# Patient Record
(demographics unavailable — no encounter records)

---

## 2025-02-05 NOTE — PHYSICAL EXAM
[General Appearance - Alert] : alert [General Appearance - In No Acute Distress] : in no acute distress [General Appearance - Well-Appearing] : healthy appearing [Oriented To Time, Place, And Person] : oriented to person, place, and time [Impaired Insight] : insight and judgment were intact [Affect] : the affect was normal [Memory Recent] : recent memory was not impaired [Motor Handedness Right-Handed] : the patient is right hand dominant [4] : T1 abductor digiti minimi 4/5 [5] : T1 abductor digiti minimi 5/5 [Max] : Max's sign was demonstrated [Sensation Tactile Decrease] : light touch was intact [Sclera] : the sclera and conjunctiva were normal [Neck Appearance] : the appearance of the neck was normal [] : no respiratory distress [Respiration, Rhythm And Depth] : normal respiratory rhythm and effort [Abnormal Walk] : normal gait [Skin Color & Pigmentation] : normal skin color and pigmentation

## 2025-02-05 NOTE — REVIEW OF SYSTEMS
[As Noted in HPI] : as noted in HPI [Hand Weakness] :  hand weakness [Numbness] : numbness [Tingling] : tingling [Fever] : no fever [Chills] : no chills

## 2025-02-05 NOTE — HISTORY OF PRESENT ILLNESS
[de-identified] : 30 y/o right- handed female with PMHx of pituitary lesion dx in 2013 during workup for migraine headaches at that time with elevated prolactin that has since normalized on birth control (follows with annual/biannual MRIs) who presents today for evaluation of right hand numbness.   - Patient endorses intermittent right hand numbness that started approx March 2024 without known injury to precipitate onset. Numbness is to whole right hand when happens and accompanied by feeling heavy/weak with dexterity issues and clumsiness. She will drop things when occurs. Episodes increasing in frequency.  - Most recently about 2 weeks ago she had episode of right hand numbness that also traveled up her arm and to the right side of her face. Describes half of her face as numb without any facial asymmetry. She went to the ER and endorses was dx with muscle spasm and to follow- up outpatient. Facial numbness resolved that day.   - Presents today for eval. Continues to have episodes of intermittent right hand numbness/ dexterity issues and clumsiness. Also now with intermittent left hand 4th and 5th digit numbness. Denies neck pain but does feel that the right side is "tense" compared to the left side.  Denies balance issues, bowel/bladder incontinence.

## 2025-02-05 NOTE — DATA REVIEWED
[de-identified] :    ACC: [id number] EXAM: XR C SPINE AP LAT ONLY 2-3V ORDERED BY: HOLLIE LIAO  PROCEDURE DATE: 01/26/2025    INTERPRETATION: 2 views of the cervical spine.  Clinical indications: Neck pain.  IMPRESSION: There is no fracture or subluxation. The disc heights are preserved. The soft tissues are normal.  --- End of Report ---   [de-identified] : EXAM: [id number] - MR BRAIN WAW IC - ORDERED BY: ROSHAN ALCARAZ   PROCEDURE DATE: 07/23/2024    INTERPRETATION: CLINICAL INDICATION: Microadenoma, yearly screening.  TECHNIQUE: Multi-planar multi-sequential MR imaging of the pituitary was performed before and after the intravenous administration of contrast, including dynamic imaging. Gadavist IV contrast dose: 5 cc administered  COMPARISON: MRI pituitary 8/14/2020.  FINDINGS:  The sella is normal in size. The pituitary gland is normal. There is no focal area of decreased enhancement within the pituitary gland. The infundibulum is midline. The suprasellar region, optic chiasm and cavernous sinuses are normal.  No acute infarction or intracranial hemorrhage. There is no abnormal intraparenchymal enhancement.  The ventricles are normal without evidence of hydrocephalus. There are no extra-axial fluid collections.  The visualized intraorbital contents are normal. The imaged portions of the paranasal sinuses are clear. The mastoid air cells are clear. The visualized soft tissues and other osseous structures appear normal.  IMPRESSION:  No discrete pituitary lesion identified on the current exam.  --- End of Report ---

## 2025-02-05 NOTE — DATA REVIEWED
[de-identified] :    ACC: [id number] EXAM: XR C SPINE AP LAT ONLY 2-3V ORDERED BY: HOLLIE LIAO  PROCEDURE DATE: 01/26/2025    INTERPRETATION: 2 views of the cervical spine.  Clinical indications: Neck pain.  IMPRESSION: There is no fracture or subluxation. The disc heights are preserved. The soft tissues are normal.  --- End of Report ---   [de-identified] : EXAM: [id number] - MR BRAIN WAW IC - ORDERED BY: ROSHAN ALCARAZ   PROCEDURE DATE: 07/23/2024    INTERPRETATION: CLINICAL INDICATION: Microadenoma, yearly screening.  TECHNIQUE: Multi-planar multi-sequential MR imaging of the pituitary was performed before and after the intravenous administration of contrast, including dynamic imaging. Gadavist IV contrast dose: 5 cc administered  COMPARISON: MRI pituitary 8/14/2020.  FINDINGS:  The sella is normal in size. The pituitary gland is normal. There is no focal area of decreased enhancement within the pituitary gland. The infundibulum is midline. The suprasellar region, optic chiasm and cavernous sinuses are normal.  No acute infarction or intracranial hemorrhage. There is no abnormal intraparenchymal enhancement.  The ventricles are normal without evidence of hydrocephalus. There are no extra-axial fluid collections.  The visualized intraorbital contents are normal. The imaged portions of the paranasal sinuses are clear. The mastoid air cells are clear. The visualized soft tissues and other osseous structures appear normal.  IMPRESSION:  No discrete pituitary lesion identified on the current exam.  --- End of Report ---

## 2025-02-05 NOTE — HISTORY OF PRESENT ILLNESS
[de-identified] : 28 y/o right- handed female with PMHx of pituitary lesion dx in 2013 during workup for migraine headaches at that time with elevated prolactin that has since normalized on birth control (follows with annual/biannual MRIs) who presents today for evaluation of right hand numbness.   - Patient endorses intermittent right hand numbness that started approx March 2024 without known injury to precipitate onset. Numbness is to whole right hand when happens and accompanied by feeling heavy/weak with dexterity issues and clumsiness. She will drop things when occurs. Episodes increasing in frequency.  - Most recently about 2 weeks ago she had episode of right hand numbness that also traveled up her arm and to the right side of her face. Describes half of her face as numb without any facial asymmetry. She went to the ER and endorses was dx with muscle spasm and to follow- up outpatient. Facial numbness resolved that day.   - Presents today for eval. Continues to have episodes of intermittent right hand numbness/ dexterity issues and clumsiness. Also now with intermittent left hand 4th and 5th digit numbness. Denies neck pain but does feel that the right side is "tense" compared to the left side.  Denies balance issues, bowel/bladder incontinence.

## 2025-02-05 NOTE — ASSESSMENT
[FreeTextEntry1] : 30 y/o right- handed female with PMHx of pituitary lesion that she follows with serial imaging (last MRI August  which was without lesion) who presented for evaluation of intermittent whole right hand numbness that started approx March 2024 without known injury to precipitate onset. Numbness episodes accompanied with feelings of hand heaviness and weakness. Also notes to have clumsiness and dropping things. Most recently with intermittent left 4th and 5th digit numbness as well as an episode of right hand numbness that also traveled up her arm and to the right side of her face without facial weakness that resolved.  On exam with right hand weakness. Positive Max.   Will order MRI cervical and MRA neck for further workup/ rule out acute path.  Physical therapy referral provided.  Plan made to RTC after imaging for review.   The patient's questions were answered.  The patient demonstrated an excellent understanding of todays discussion and next steps in treatment plan.

## 2025-02-12 NOTE — DATA REVIEWED
[de-identified] : EXAM: [id number] - MR SPINE CERVICAL  - ORDERED BY: JOSH AU   PROCEDURE DATE:  02/07/2025    INTERPRETATION:  MR CERVICAL SPINE  CLINICAL INFORMATION: right hand weakness and dexterity issues, r/o acute path; TECHNIQUE: MR CERVICAL SPINE COMPARISON: None available  FINDINGS:  DISC LEVEL EVALUATION:  C1/C2: Normal. C2/C3: Normal. C3/C4: Small right paracentral protrusion. Which minimally indents the ventral thecal sac. C4/C5: Minimal central protrusion. C5/C6: Minimal central protrusion. C6/C7: Mild osseous ridging mildly narrowing the right neural foramen. C7/T1: Normal.  ALIGNMENT: Straightening of the normal cervical lordosis. CORD: No cord signal abnormality. MARROW: No fracture. Normal marrow signal. DISCS: Mild multilevel loss of disc heights and disc signal. IMAGED BRAIN: Normal. PERIPHERAL/NECK SOFT TISSUES: Normal.  IMPRESSION:  *  Mild right foraminal narrowing at C6-C7. *  No spinal canal stenosis. *  No cord signal abnormality.  --- End of Report ---       MICHELLE GRIFFITH MD; Attending Radiologist This document has been electronically signed. Feb 9 2025  1:33PM [de-identified] : EXAM: [id number] - MR ANGIO NECK  - ORDERED BY: JOSH AU   PROCEDURE DATE:  02/07/2025    INTERPRETATION:  .  CLINICAL INFORMATION: Anesthesia of the skin. Evaluate for stenosis.  TECHNIQUE: MRA images through the neck was obtained using a combination of 2-D and 3-D time-of-flight acquisition. The data was then reformatted into a volumetric data set and reviewed as rotational MIP images.  COMPARISON: There are no comparison MRIs.  FINDINGS: There is a standard anatomic configuration to the aortic arch.  The origins of the great vessels appear unremarkable. The bilateral common carotid arteries and carotid bifurcations appear unremarkable.  The bilateral cervical internal carotid arteries are within normal limits.  The origins of the bilateral vertebral arteries are normal. The bilateral cervical vertebral arteries are normal in course and caliber.  IMPRESSION: Unremarkable study.  --- End of Report ---

## 2025-02-12 NOTE — HISTORY OF PRESENT ILLNESS
[de-identified] : 28 y/o right- handed female, never smoker, with PMHx of pituitary lesion dx in 2013 during workup for migraine headaches at that time with elevated prolactin that has since normalized on birth control (follows with annual/biannual MRIs), no PSHx who presents today for evaluation of right neck pain and right hand numbness.  - Patient endorses intermittent right hand numbness that started approx March 2024 without known injury to precipitate onset. Numbness is to whole right hand when happens and accompanied by feeling heavy/weak with dexterity issues and clumsiness. She will drop things when occurs. Episodes increasing in frequency. - Most recently about 2 weeks ago she had episode of right hand numbness that also traveled up her arm and to the right side of her face. Describes half of her face as numb without any facial asymmetry. She went to the ER and endorses was dx with muscle spasm and to follow- up outpatient. Facial numbness resolved that day.  - Presents today for eval.  She feels her pain has worsened since last week. Now with intermittent sharp right neck pain that travels up towards her ear and down the inside of her arm to her whole hand with hand weakness/heaviness. She feels cannot hold her phone s/t feeling heavy.  Denies balance issues, bowel/bladder incontinence.  She had carpal tunnel workup with EMG about 1-2 years ago which was negative.   PCP: Stephanie Carrasco [phone] [address] [place] [id number]

## 2025-02-12 NOTE — ASSESSMENT
[FreeTextEntry1] : This 29-year-old female presents with worsening right-sided neck pain radiating down her right arm to her hand, accompanied by numbness and perceived weakness in the hand. The symptoms began in March 2024 and have progressively worsened, now interfering with sleep.  She has a history of weight loss, hormonal imbalance, migraines, and a pituitary microadenoma, which has reportedly resolved. She is currently taking birth control pills.  She works on a computer regularly. A recent cervical spine MRI was unremarkable, and a prior EMG was inconclusive.  An upright flexion/extension x-ray of the cervical spine was recommended.   She will be referred for an EMG with paraspinal muscle testing to evaluate for carpal tunnel syndrome or other nerve-related issues.  Pain medication was prescribed, and physical therapy was recommended.  A referral to pain management may be considered if symptoms persist.  A physiatry referral was also suggested. She will follow up to review the EMG results.  A follow-up appointment in one year was suggested to monitor her pituitary status.   Dr. D'Amico independently reviewed all available images with patient.   PLAN: - Cervical Xray with flex/ex  - Medrol dose pack - Physical therapy referral - Referral to PMR Dr. Joselin Friedman  - Will plan to repeat MRI pituitary w/wo in one year   Patient verbalizes understanding of today's discussion and next steps in treatment plan.   Today, my ACP, Diamond Espinosa, was here to observe my evaluation and management services for this new problem/exacerbated condition to be followed going forward.     A total of 45 minutes was spent reviewing the labs, imaging and physical examination of the patient. We discussed the diagnosis, and the plan. The patient's questions were answered. The patient demonstrated an excellent understanding of the plan.

## 2025-02-12 NOTE — DATA REVIEWED
[de-identified] : EXAM: [id number] - MR SPINE CERVICAL  - ORDERED BY: JOSH AU   PROCEDURE DATE:  02/07/2025    INTERPRETATION:  MR CERVICAL SPINE  CLINICAL INFORMATION: right hand weakness and dexterity issues, r/o acute path; TECHNIQUE: MR CERVICAL SPINE COMPARISON: None available  FINDINGS:  DISC LEVEL EVALUATION:  C1/C2: Normal. C2/C3: Normal. C3/C4: Small right paracentral protrusion. Which minimally indents the ventral thecal sac. C4/C5: Minimal central protrusion. C5/C6: Minimal central protrusion. C6/C7: Mild osseous ridging mildly narrowing the right neural foramen. C7/T1: Normal.  ALIGNMENT: Straightening of the normal cervical lordosis. CORD: No cord signal abnormality. MARROW: No fracture. Normal marrow signal. DISCS: Mild multilevel loss of disc heights and disc signal. IMAGED BRAIN: Normal. PERIPHERAL/NECK SOFT TISSUES: Normal.  IMPRESSION:  *  Mild right foraminal narrowing at C6-C7. *  No spinal canal stenosis. *  No cord signal abnormality.  --- End of Report ---       MICHELLE GRIFFITH MD; Attending Radiologist This document has been electronically signed. Feb 9 2025  1:33PM [de-identified] : EXAM: [id number] - MR ANGIO NECK  - ORDERED BY: JOSH AU   PROCEDURE DATE:  02/07/2025    INTERPRETATION:  .  CLINICAL INFORMATION: Anesthesia of the skin. Evaluate for stenosis.  TECHNIQUE: MRA images through the neck was obtained using a combination of 2-D and 3-D time-of-flight acquisition. The data was then reformatted into a volumetric data set and reviewed as rotational MIP images.  COMPARISON: There are no comparison MRIs.  FINDINGS: There is a standard anatomic configuration to the aortic arch.  The origins of the great vessels appear unremarkable. The bilateral common carotid arteries and carotid bifurcations appear unremarkable.  The bilateral cervical internal carotid arteries are within normal limits.  The origins of the bilateral vertebral arteries are normal. The bilateral cervical vertebral arteries are normal in course and caliber.  IMPRESSION: Unremarkable study.  --- End of Report ---

## 2025-02-12 NOTE — PHYSICAL EXAM
[General Appearance - Alert] : alert [General Appearance - In No Acute Distress] : in no acute distress [General Appearance - Well-Appearing] : healthy appearing [Oriented To Time, Place, And Person] : oriented to person, place, and time [Impaired Insight] : insight and judgment were intact [Affect] : the affect was normal [Memory Recent] : recent memory was not impaired [Motor Handedness Right-Handed] : the patient is right hand dominant [4] : T1 abductor digiti minimi 4/5 [5] : T1 abductor digiti minimi 5/5 [Sensation Tactile Decrease] : light touch was intact [Sclera] : the sclera and conjunctiva were normal [Neck Appearance] : the appearance of the neck was normal [] : no respiratory distress [Respiration, Rhythm And Depth] : normal respiratory rhythm and effort [Abnormal Walk] : normal gait [Skin Color & Pigmentation] : normal skin color and pigmentation

## 2025-02-12 NOTE — REVIEW OF SYSTEMS
[Hand Weakness] :  hand weakness [Numbness] : numbness [As Noted in HPI] : as noted in HPI [Fever] : no fever [Chills] : no chills